# Patient Record
Sex: FEMALE | Race: WHITE | NOT HISPANIC OR LATINO | URBAN - METROPOLITAN AREA
[De-identification: names, ages, dates, MRNs, and addresses within clinical notes are randomized per-mention and may not be internally consistent; named-entity substitution may affect disease eponyms.]

---

## 2017-11-16 ENCOUNTER — OFFICE (AMBULATORY)
Dept: URBAN - METROPOLITAN AREA CLINIC 64 | Facility: CLINIC | Age: 72
End: 2017-11-16

## 2017-11-16 VITALS
HEIGHT: 63 IN | HEART RATE: 60 BPM | DIASTOLIC BLOOD PRESSURE: 76 MMHG | WEIGHT: 150 LBS | SYSTOLIC BLOOD PRESSURE: 153 MMHG

## 2017-11-16 DIAGNOSIS — K21.9 GASTRO-ESOPHAGEAL REFLUX DISEASE WITHOUT ESOPHAGITIS: ICD-10-CM

## 2017-11-16 PROCEDURE — 99212 OFFICE O/P EST SF 10 MIN: CPT | Performed by: INTERNAL MEDICINE

## 2017-11-16 RX ORDER — OMEPRAZOLE 20 MG/1
20 CAPSULE, DELAYED RELEASE ORAL
Qty: 90 | Refills: 3 | Status: ACTIVE

## 2018-11-02 ENCOUNTER — OFFICE (AMBULATORY)
Dept: URBAN - METROPOLITAN AREA CLINIC 64 | Facility: CLINIC | Age: 73
End: 2018-11-02

## 2018-11-02 VITALS
HEART RATE: 56 BPM | SYSTOLIC BLOOD PRESSURE: 161 MMHG | DIASTOLIC BLOOD PRESSURE: 71 MMHG | HEIGHT: 63 IN | WEIGHT: 147 LBS

## 2018-11-02 DIAGNOSIS — K21.9 GASTRO-ESOPHAGEAL REFLUX DISEASE WITHOUT ESOPHAGITIS: ICD-10-CM

## 2018-11-02 DIAGNOSIS — R39.15 URGENCY OF URINATION: ICD-10-CM

## 2018-11-02 PROCEDURE — 99214 OFFICE O/P EST MOD 30 MIN: CPT | Performed by: INTERNAL MEDICINE

## 2018-11-02 RX ORDER — OMEPRAZOLE 20 MG/1
20 CAPSULE, DELAYED RELEASE ORAL
Qty: 90 | Refills: 3 | Status: ACTIVE

## 2018-11-02 RX ORDER — LEVOFLOXACIN 250 MG/1
250 TABLET, FILM COATED ORAL
Qty: 3 | Refills: 0 | Status: COMPLETED
Start: 2018-11-02 | End: 2019-11-18

## 2019-11-18 ENCOUNTER — OFFICE (AMBULATORY)
Dept: URBAN - METROPOLITAN AREA CLINIC 64 | Facility: CLINIC | Age: 74
End: 2019-11-18

## 2019-11-18 VITALS
HEART RATE: 53 BPM | SYSTOLIC BLOOD PRESSURE: 185 MMHG | HEIGHT: 63 IN | DIASTOLIC BLOOD PRESSURE: 89 MMHG | WEIGHT: 129 LBS

## 2019-11-18 DIAGNOSIS — K21.9 GASTRO-ESOPHAGEAL REFLUX DISEASE WITHOUT ESOPHAGITIS: ICD-10-CM

## 2019-11-18 DIAGNOSIS — I48.91 UNSPECIFIED ATRIAL FIBRILLATION: ICD-10-CM

## 2019-11-18 DIAGNOSIS — I10 ESSENTIAL (PRIMARY) HYPERTENSION: ICD-10-CM

## 2019-11-18 DIAGNOSIS — Z86.010 PERSONAL HISTORY OF COLONIC POLYPS: ICD-10-CM

## 2019-11-18 PROCEDURE — 99213 OFFICE O/P EST LOW 20 MIN: CPT | Performed by: INTERNAL MEDICINE

## 2019-11-18 RX ORDER — OMEPRAZOLE 20 MG/1
20 CAPSULE, DELAYED RELEASE ORAL
Qty: 90 | Refills: 3 | Status: ACTIVE

## 2023-05-03 ENCOUNTER — TRANSCRIBE ORDERS (OUTPATIENT)
Dept: PHYSICAL THERAPY | Facility: CLINIC | Age: 78
End: 2023-05-03

## 2023-05-03 DIAGNOSIS — M79.604 RIGHT LEG PAIN: Primary | ICD-10-CM

## 2023-05-04 ENCOUNTER — TREATMENT (OUTPATIENT)
Dept: PHYSICAL THERAPY | Facility: CLINIC | Age: 78
End: 2023-05-04
Payer: MEDICARE

## 2023-05-04 DIAGNOSIS — M25.551 RIGHT HIP PAIN: ICD-10-CM

## 2023-05-04 DIAGNOSIS — M79.604 RIGHT LEG PAIN: Primary | ICD-10-CM

## 2023-05-04 PROCEDURE — 97162 PT EVAL MOD COMPLEX 30 MIN: CPT | Performed by: PHYSICAL THERAPIST

## 2023-05-04 PROCEDURE — 97110 THERAPEUTIC EXERCISES: CPT | Performed by: PHYSICAL THERAPIST

## 2023-05-04 NOTE — PROGRESS NOTES
Physical Therapy Initial Evaluation and Plan of Care  Office: [address] [facility] [address], [place] [address]  P: [phone]  F: [phone]    Patient: Nikole Long   : 1945  Diagnosis/ICD-10 Code:  Right leg pain [M79.604]  Referring practitioner: Nikolas Crabtree MD  Date of Initial Visit: 2023  Today's Date: 2023  Patient seen for 1 sessions           Subjective Questionnaire: LEFS: 20% functional ability       Subjective Evaluation    History of Present Illness  Mechanism of injury: Pt reports that she is having R hip and thigh pain. Pain is intermittent. Takes pain medication which makes the pain go away and sometimes it doesn't come back afterwards. Sometimes doesn't go away completely. Feels like the pain is progressing. Also is having some dizziness today if she moves her head too quickly. Started today. Saw MD yesterday and they gave her a medication for muscle relaxer and for nerve pain. Also taking another pain medication. Hasn't helped much so far. Had x-rays done on Monday at [facility] and they didn't really show anything. They think maybe muscle tear so sent to Ortho specialist. Going to have MRI done tomorrow. Pain began a few weeks ago. Had previous bout back in Dec which lasted ~3 weeks. Had MRI and CT scan which didn't show anything that PCP could see. ER earlier this week. No pain in the lower back, just in the R leg. No numbness or tingling. Pain doesn't go to the knee or past it. No falls or car accidents recently. Pain tends to be worse in the morning. Did wake her up last night from the pain.     Pain  Current pain ratin  At best pain ratin  At worst pain rating: 10  Quality: sharp, knife-like and dull ache  Relieving factors: medications  Aggravating factors: movement, ambulation and sleeping    Diagnostic Tests  X-ray: normal (per pt report )    Patient Goals  Patient goals for therapy: decreased edema, decreased pain, increased strength, independence with  ADLs/IADLs, increased motion and return to sport/leisure activities  Patient goal: gardening, shopping, sleeping          History reviewed. No pertinent past medical history.   Pt reports h/o Afib, HTN, high cholesterol, allergy to penicillin and osteoporosis.     No past surgical history on file.     Objective          Palpation     Additional Palpation Details  No tenderness around the hip     Neurological Testing     Sensation     Hip   Left Hip   Intact: light touch    Right Hip   Intact: light touch    Reflexes   Left   Babinski sign: negative  Clonus sign: negative    Right   Babinski sign: negative  Clonus sign: negative    Active Range of Motion     Lumbar   Flexion: WFL  Extension: 50 degrees with pain  Left lateral flexion: WFL  Right lateral flexion: 50 degrees with pain  Left rotation: WFL  Right rotation: WFL  Left Hip   Internal rotation (90/90): 50 degrees     Right Hip   Internal rotation (90/90): 25 degrees     Additional Active Range of Motion Details  Measurements reported as percentage of normal ROM.    Measurements reported as percentage of normal ROM.    Min soft tissue tightness with ER     Strength/Myotome Testing     Left Hip   Planes of Motion   Flexion: 4  Extension: 3+  Abduction: 3+  Adduction: 4-    Right Hip   Planes of Motion   Flexion: 4-  Extension: 3+  Abduction: 3+  Adduction: 3+    Tests     Right Hip   Negative scour.   SLR: Negative.     Ambulation   Weight-Bearing Status   Assistive device used: none    Ambulation: Level Surfaces   Ambulation without assistive device: independent    Observational Gait   Gait: antalgic   Decreased right stance time.     Additional Observational Gait Details  IR of B hips with R worse than L causing toe-in pattern           Assessment & Plan     Assessment  Impairments: abnormal coordination, abnormal gait, abnormal muscle firing, abnormal muscle tone, abnormal or restricted ROM, activity intolerance, impaired balance, impaired physical  strength, lacks appropriate home exercise program and pain with function  Functional Limitations: carrying objects, lifting, sleeping, walking, uncomfortable because of pain, moving in bed, sitting, standing and stooping  Assessment details: Pt is a 78 year old female with c/o R thigh pain. Pt demonstrates some restriction with hip IR B, however did not reproduce her leg pain. Did note some increased pain with lumbar ext as well as R lateral flex. Poor core stabilization noted as well as decreased strength B hips. Functional limitations are listed above. Pt will benefit from PT in order to address impairments and improve overall function.     Prognosis: good    Goals  Plan Goals: STG (3 weeks):  Pt will demonstrate increased activation of core stabilizers during activities/exercises to decrease load on hip/thigh.   Pt will display improved ROM of lumbar spine as well as hip to WFL with min to no pain to assist with functional tasks.     LTG (6 weeks):  Pt will be independent with home exercises to assist with improved strength and continue to improve function.   Pt will demonstrate decreased score on the LEFS to 50% to demonstrate decreased overall impairment.   Pt will be able to amb with less pain in the R thigh as well as be able to sleep through the night without waking up due to pain.   Pt will demonstrate improved hip and core strength to 4/5 overall to assist with function.        Plan  Therapy options: will be seen for skilled therapy services  Planned modality interventions: TENS, cryotherapy and thermotherapy (hydrocollator packs)  Planned therapy interventions: abdominal trunk stabilization, ADL retraining, balance/weight-bearing training, body mechanics training, flexibility, functional ROM exercises, gait training, home exercise program, joint mobilization, manual therapy, motor coordination training, neuromuscular re-education, postural training, soft tissue mobilization, spinal/joint mobilization,  strengthening, stretching and therapeutic activities  Frequency: 2x week  Duration in weeks: 12  Treatment plan discussed with: patient        HEP:   Access Code: [email or web address]  URL: [email or web address]  Date: 05/10/2023  Prepared by: Delmy Hall    Exercises  - Supine Lower Trunk Rotation  - 2 x daily - 10 reps - 5 sec hold  - Supine Single Knee to Chest Stretch  - 2 x daily - 3 reps - 30 sec hold  - Supine Hip Adduction Isometric with Ball  - 2 x daily - 10 reps - 5 sec hold    History # of Personal Factors and/or Comorbidities: MODERATE (1-2)  Examination of Body System(s): # of elements: MODERATE (3)  Clinical Presentation: EVOLVING  Clinical Decision Making: MODERATE      Eval:  Low Eval          Mins  69504  Mod Eval     30     Mins  67824  High Eval                            Mins  12554    Timed:         Manual Therapy:         mins  90659;     Therapeutic Exercise:    15     mins  75008;     Neuromuscular Sharlene:        mins  38530;    Therapeutic Activity:          mins  19291;     Gait Training:           mins  03276;       Un-Timed:  Electrical Stimulation:         mins  62584 ( );  Traction          mins 79778      Timed Treatment:   15   mins   Total Treatment:     45   mins    PT SIGNATURE: Delmy Hall, PT, DPT, OCS           [place] License # [id number]              DATE TREATMENT INITIATED: 5/4/2023    Initial Certification  Certification Period: 8/1/2023  I certify that the therapy services are furnished while this patient is under my care.  The services outlined above are required by this patient, and will be reviewed every 90 days.     PHYSICIAN: Nikolas Crabtree MD      DATE:     Please sign and return via fax to [phone].. Thank you, [facility].

## 2023-05-12 ENCOUNTER — TREATMENT (OUTPATIENT)
Dept: PHYSICAL THERAPY | Facility: CLINIC | Age: 78
End: 2023-05-12
Payer: MEDICARE

## 2023-05-12 DIAGNOSIS — M25.551 RIGHT HIP PAIN: ICD-10-CM

## 2023-05-12 DIAGNOSIS — M79.604 RIGHT LEG PAIN: Primary | ICD-10-CM

## 2023-05-12 PROCEDURE — 97140 MANUAL THERAPY 1/> REGIONS: CPT | Performed by: PHYSICAL THERAPIST

## 2023-05-12 PROCEDURE — 97110 THERAPEUTIC EXERCISES: CPT | Performed by: PHYSICAL THERAPIST

## 2023-05-12 PROCEDURE — 97112 NEUROMUSCULAR REEDUCATION: CPT | Performed by: PHYSICAL THERAPIST

## 2023-05-12 NOTE — PROGRESS NOTES
Physical Therapy Daily Note  Office: [address] [facility] [address], [place] [address]  P: [phone]  F: [phone]    Patient: Nikole Long   : 1945  Diagnosis/ICD-10 Code:  Right leg pain [M79.604]  Referring practitioner: Nikolas Crabtree MD  Today's Date: 2023  Patient seen for 2 sessions                                                                                                                                                                                                                                                                                                                               VISIT#: 2/10 sessions authorized per ins (PN due: 2023)     Precautions/Restrictions: H/o Afib, HTN, osteoporosis     Subjective  Nikole Long and her  provided subjective history. Pt's  reports that he had to call 911 on  morning because pt was in so much pain. They did CT scan, MRI and x-rays per 's reports and didn't find anything. Says they kept telling him they think it's a muscle tear but he doesn't know which muscle. Pain isn't as severe during the day but is worse at night time. She hasn't done any of the exercises because she was afraid.       Objective  Tenderness R side lumbar spine, min posterior hip, none in R LE     See Exercise, Manual, and Modality Logs for complete treatment.     Home Exercises:  [id number]    Assessment/Plan   Pt displays tenderness in posterior hip musculature as well a R lower lumbar facets. Improved with manual treatment. Instructed pt through exercises again and discussed performing around breakfast and then again after lunch but to stop if she notices any increase in symptoms. Pt and pt's  also educated on how joint dysfunctions as well as irritation of nerves don't always show up on imaging so that could be why the MRI and CT scans were both neg.       Progress per Plan of Care and Progress strengthening /stabilization  /functional activity            Timed:         Manual Therapy:    15     mins  47384;     Therapeutic Exercise:    15     mins  26060;     Neuromuscular Sharlene:    15    mins  28942;    Therapeutic Activity:          mins  03690;     Gait Training:           mins  98373;     Ultrasound:          mins  22441;    Ionto                                   mins   73863  Self Care                            mins   16119    Un-Timed:  Electrical Stimulation:         mins  68856 ( );  Traction          mins 76607    Timed Treatment:   45   mins   Total Treatment:     45   mins    Delmy Hall PT, DPT, OCS     [place] License # [id number]

## 2023-05-16 ENCOUNTER — TREATMENT (OUTPATIENT)
Dept: PHYSICAL THERAPY | Facility: CLINIC | Age: 78
End: 2023-05-16
Payer: MEDICARE

## 2023-05-16 DIAGNOSIS — M79.604 RIGHT LEG PAIN: Primary | ICD-10-CM

## 2023-05-16 DIAGNOSIS — M25.551 RIGHT HIP PAIN: ICD-10-CM

## 2023-05-16 PROCEDURE — 97112 NEUROMUSCULAR REEDUCATION: CPT | Performed by: PHYSICAL THERAPIST

## 2023-05-16 PROCEDURE — 97140 MANUAL THERAPY 1/> REGIONS: CPT | Performed by: PHYSICAL THERAPIST

## 2023-05-16 PROCEDURE — 97110 THERAPEUTIC EXERCISES: CPT | Performed by: PHYSICAL THERAPIST

## 2023-05-16 NOTE — PROGRESS NOTES
Physical Therapy Daily Note  Office: [address] [facility] [address], [place] [address]  P: [phone]  F: [phone]    Patient: Nikole Long   : 1945  Diagnosis/ICD-10 Code:  Right leg pain [M79.604]  Referring practitioner: Nikolas Crabtree MD  Today's Date: 2023  Patient seen for 3 sessions                                                                                                                                                                                                                                                                                                                               VISIT#: 3/10 sessions authorized per ins (PN due: 2023)     Precautions/Restrictions: H/o Afib, HTN, osteoporosis     Subjective  Nikole Long reports that she is having more pain today. Pain always increases throughout the night and is the worst in the morning when she tries to get up. Thinks it's from lying still so long at night. Once she has pain medicine, then the pain eases enough so that she can get up and move and then the pain is more tolerable during the day. They haven't been able to get more pain medicine yet because Yousuf is having issues with their system but they got a phone call so they're going to go to the MD's office today to check on it.       Objective  Tenderness R side lumbar spine, min posterior hip, none in R LE     See Exercise, Manual, and Modality Logs for complete treatment.     Home Exercises:  [id number]    Assessment/Plan   Pt displays more tenderness in posterior hip musculature that improved with manual treatment. Less pain noted after manual treatment. Did have some pain with SKTC, however when used strap, there was no pain. Attempted MHP at end of session which pt liked, however pain did increased possibly from hooklying position. Pain eased once she sat up.       Progress per Plan of Care and Progress strengthening /stabilization /functional activity             Timed:         Manual Therapy:    15     mins  66404;     Therapeutic Exercise:    15     mins  18617;     Neuromuscular Sharlene:    8    mins  48296;    Therapeutic Activity:          mins  52788;     Gait Training:           mins  69334;     Ultrasound:          mins  65462;    Ionto                                   mins   76712  Self Care                            mins   63429    Un-Timed:  Electrical Stimulation:         mins  26674 ( );  Traction          mins 73039  MHP                             10   mins     Timed Treatment:   38   mins   Total Treatment:     48   mins    Delmy Hall PT, DPT, OCS     [place] License # [id number]

## 2023-05-19 ENCOUNTER — TREATMENT (OUTPATIENT)
Dept: PHYSICAL THERAPY | Facility: CLINIC | Age: 78
End: 2023-05-19
Payer: MEDICARE

## 2023-05-19 DIAGNOSIS — M79.604 RIGHT LEG PAIN: Primary | ICD-10-CM

## 2023-05-19 DIAGNOSIS — M25.551 RIGHT HIP PAIN: ICD-10-CM

## 2023-05-19 PROCEDURE — 97110 THERAPEUTIC EXERCISES: CPT | Performed by: PHYSICAL THERAPIST

## 2023-05-19 PROCEDURE — 97140 MANUAL THERAPY 1/> REGIONS: CPT | Performed by: PHYSICAL THERAPIST

## 2023-05-19 PROCEDURE — 97112 NEUROMUSCULAR REEDUCATION: CPT | Performed by: PHYSICAL THERAPIST

## 2023-05-19 NOTE — PROGRESS NOTES
Physical Therapy Daily Note  Office: [address] [facility] [address], [place] [address]  P: [phone]  F: [phone]    Patient: Nikole Long   : 1945  Diagnosis/ICD-10 Code:  Right leg pain [M79.604]  Referring practitioner: Nikolas Crabtree MD  Today's Date: 2023  Patient seen for 4 sessions                                                                                                                                                                                                                                                                                                                               VISIT#: 4/10 sessions authorized per ins (PN due: 2023)     Precautions/Restrictions: H/o Afib, HTN, osteoporosis     Subjective  Nikole Long reports that she is still having pain. Feels like it might be getting worse. Is always there in the morning but had increased yesterday afternoon too. Not sure why. Exercises are going okay and she doesn't feel like any of them are increasing pain. She did fall ~1 week ago now and landed on the R hip. Has some bruising which is subsiding now, however tenderness on the ischial tuberosity has increased. Doesn't feel it normally, just if you push on it. Hasn't told her MD about it yet.       Objective  Tenderness R side lumbar spine, min posterior hip, none in R LE     See Exercise, Manual, and Modality Logs for complete treatment.     Home Exercises:  [id number]    Assessment/Plan   Pt displays less pain in the posterolateral hip musculature after manual treatment. Did note more hypertonicity in the posterior hip musculature this date as well. Pt noted some pain with LTR this date so held. Instructed pt and her  that they should contact MD about the fall to see if they want to do x-rays since pain is worsening now. Also instructed them to hold any exercises that starts to increase her pain even if it didn't the day before.    Progress per Plan of  Care and Progress strengthening /stabilization /functional activity            Timed:         Manual Therapy:    15     mins  16061;     Therapeutic Exercise:    15     mins  64880;     Neuromuscular Sharlene:    8    mins  76009;    Therapeutic Activity:          mins  88163;     Gait Training:           mins  67232;     Ultrasound:          mins  11291;    Ionto                                   mins   88158  Self Care                            mins   40864    Un-Timed:  Electrical Stimulation:         mins  51388 ( );  Traction          mins 47772  MHP                             10   mins     Timed Treatment:   38   mins   Total Treatment:     48   mins    Delmy Hall PT, DPT, OCS     [place] License # [id number]

## 2023-05-24 ENCOUNTER — TREATMENT (OUTPATIENT)
Dept: PHYSICAL THERAPY | Facility: CLINIC | Age: 78
End: 2023-05-24

## 2023-05-24 DIAGNOSIS — M25.551 RIGHT HIP PAIN: ICD-10-CM

## 2023-05-24 DIAGNOSIS — M79.604 RIGHT LEG PAIN: Primary | ICD-10-CM

## 2023-05-24 PROCEDURE — 97112 NEUROMUSCULAR REEDUCATION: CPT | Performed by: PHYSICAL THERAPIST

## 2023-05-24 PROCEDURE — 97110 THERAPEUTIC EXERCISES: CPT | Performed by: PHYSICAL THERAPIST

## 2023-05-24 PROCEDURE — 97140 MANUAL THERAPY 1/> REGIONS: CPT | Performed by: PHYSICAL THERAPIST

## 2023-05-24 NOTE — PROGRESS NOTES
Physical Therapy Daily Note  Office: [address] [facility] [address], [place] [address]  P: [phone]  F: [phone]    Patient: Nikole Long   : 1945  Diagnosis/ICD-10 Code:  Right leg pain [M79.604]  Referring practitioner: Nikolas Crabtree MD  Today's Date: 2023  Patient seen for 5 sessions                                                                                                                                                                                                                                                                                                                               VISIT#: 5/10 sessions authorized per ins (PN due: 2023)     Precautions/Restrictions: H/o Afib, HTN, osteoporosis     Subjective  Nikole Long and  reports that she had pain over the weekend still. Called Ortho and went to see him yesterday. Had x-rays of hip taken again and showed no fractures from her most recent fall. MD couldn't see anything wrong. Pt has been feeling better the last few days and not having as much pain. Has had a couple evenings that she had no pain, not even the soreness. Had a bad night last night with pain but no sharp pains this morning when she went to get up and it's not feeling too bad right now.       Objective  Tenderness R side lumbar spine, min posterior hip, none in R LE     See Exercise, Manual, and Modality Logs for complete treatment.     Home Exercises:  [id number]    Assessment/Plan   Pt displays good progress with core stabilization. Some cuing required for technique but pt is improving with that as well. Pt seems to be improving overall.     Progress per Plan of Care and Progress strengthening /stabilization /functional activity            Timed:         Manual Therapy:    15     mins  52782;     Therapeutic Exercise:    15     mins  93462;     Neuromuscular Sharlene:    8    mins  13636;    Therapeutic Activity:          mins  57419;     Gait  Training:           mins  73648;     Ultrasound:          mins  76081;    Ionto                                   mins   18013  Self Care                            mins   56699    Un-Timed:  Electrical Stimulation:         mins  13864 ( );  Traction          mins 70506  MHP                             10   mins     Timed Treatment:   38   mins   Total Treatment:     48   mins    Delmy Hall, PT, DPT, OCS     [place] License # [id number]

## 2023-05-26 ENCOUNTER — TREATMENT (OUTPATIENT)
Dept: PHYSICAL THERAPY | Facility: CLINIC | Age: 78
End: 2023-05-26

## 2023-05-26 DIAGNOSIS — M25.551 RIGHT HIP PAIN: ICD-10-CM

## 2023-05-26 DIAGNOSIS — M79.604 RIGHT LEG PAIN: Primary | ICD-10-CM

## 2023-05-26 PROCEDURE — 97140 MANUAL THERAPY 1/> REGIONS: CPT | Performed by: PHYSICAL THERAPIST

## 2023-05-26 PROCEDURE — 97110 THERAPEUTIC EXERCISES: CPT | Performed by: PHYSICAL THERAPIST

## 2023-05-26 PROCEDURE — 97112 NEUROMUSCULAR REEDUCATION: CPT | Performed by: PHYSICAL THERAPIST

## 2023-05-26 NOTE — PROGRESS NOTES
Physical Therapy Daily Note  Office: [address] [facility] [address], [place] [address]  P: [phone]  F: [phone]    Patient: Nikole Long   : 1945  Diagnosis/ICD-10 Code:  Right leg pain [M79.604]  Referring practitioner: Nikolas Crabtree MD  Today's Date: 2023  Patient seen for 6 sessions                                                                                                                                                                                                                                                                                                                               VISIT#: 6/10 sessions authorized per ins (PN due: 2023)     Precautions/Restrictions: H/o Afib, HTN, osteoporosis     Subjective  Nikole Long reports that her pain has been doing better. She still has pain in the mornings but the severe pain is easing off more quickly. Also hasn't really had any pain during the day for the last two days. Feels like things are starting to improve.       Objective  Tenderness R side lumbar spine, min posterior hip, none in R LE     See Exercise, Manual, and Modality Logs for complete treatment.     Home Exercises:  [id number]    Assessment/Plan   Pt displays improved ability to perform exercises with no c/o pain this date. Pt had no pain when arriving to clinic either which is improved from other sessions. Pt instructed to cont with exercises at home but only do the ones that she can without any pain even if it is different than the day before.     Progress per Plan of Care and Progress strengthening /stabilization /functional activity            Timed:         Manual Therapy:    15     mins  74574;     Therapeutic Exercise:    15     mins  71107;     Neuromuscular Sharlene:    8    mins  49555;    Therapeutic Activity:          mins  37903;     Gait Training:           mins  91356;     Ultrasound:          mins  89528;    Ionto                                   mins    65025  Self Care                            mins   18861    Un-Timed:  Electrical Stimulation:         mins  57025 ( );  Traction          mins 90438  MHP                             10   mins     Timed Treatment:   38   mins   Total Treatment:     48   mins    Delmy Hall PT, DPT, OCS     [place] License # [id number]

## 2023-06-01 ENCOUNTER — TREATMENT (OUTPATIENT)
Dept: PHYSICAL THERAPY | Facility: CLINIC | Age: 78
End: 2023-06-01
Payer: MEDICARE

## 2023-06-01 DIAGNOSIS — M25.551 RIGHT HIP PAIN: ICD-10-CM

## 2023-06-01 DIAGNOSIS — M79.604 RIGHT LEG PAIN: Primary | ICD-10-CM

## 2023-06-01 PROCEDURE — 97110 THERAPEUTIC EXERCISES: CPT | Performed by: PHYSICAL THERAPIST

## 2023-06-01 PROCEDURE — 97112 NEUROMUSCULAR REEDUCATION: CPT | Performed by: PHYSICAL THERAPIST

## 2023-06-01 PROCEDURE — 97140 MANUAL THERAPY 1/> REGIONS: CPT | Performed by: PHYSICAL THERAPIST

## 2023-06-01 NOTE — PROGRESS NOTES
Physical Therapy Daily Note  Office: [address] [facility] [address], [place] [address]  P: [phone]  F: [phone]    Patient: Nikole Long   : 1945  Diagnosis/ICD-10 Code:  Right leg pain [M79.604]  Referring practitioner: Nikolas Crabtree MD  Today's Date: 2023  Patient seen for 7 sessions                                                                                                                                                                                                                                                                                                                               VISIT#: 7/10 sessions authorized per ins (PN due: 2023)     Precautions/Restrictions: H/o Afib, HTN, osteoporosis     Subjective  Nikole Long reports that she had a good week until yesterday. Pain was really bad yesterday. But she slept through the entire night last night. Hasn't done that in a long time. Feels pretty good today.       Objective  Tenderness R side lumbar spine, min posterior hip, none in R LE     See Exercise, Manual, and Modality Logs for complete treatment.     Home Exercises:  [id number]    Assessment/Plan   Pt displays good progress with mobility of the LE's with stretching. No increased pain with treatment this date. Discussed pt continuing with HEP and that it is normal to have days with more pain still as long as she is having more days with less or no pain. Pt is progressing well overall.     Progress per Plan of Care and Progress strengthening /stabilization /functional activity            Timed:         Manual Therapy:    15     mins  36529;     Therapeutic Exercise:    15     mins  66771;     Neuromuscular Sharlene:    8    mins  42759;    Therapeutic Activity:          mins  07346;     Gait Training:           mins  46997;     Ultrasound:          mins  33601;    Ionto                                   mins   52501  Self Care                            mins    29546    Un-Timed:  Electrical Stimulation:         mins  35653 ( );  Traction          mins 21735  [facility]                                mins     Timed Treatment:   38   mins   Total Treatment:     38   mins    Delmy Hall PT, DPT, OCS     [place] License # [id number]

## 2023-06-05 ENCOUNTER — TREATMENT (OUTPATIENT)
Dept: PHYSICAL THERAPY | Facility: CLINIC | Age: 78
End: 2023-06-05
Payer: MEDICARE

## 2023-06-05 DIAGNOSIS — M79.604 RIGHT LEG PAIN: Primary | ICD-10-CM

## 2023-06-05 DIAGNOSIS — M25.551 RIGHT HIP PAIN: ICD-10-CM

## 2023-06-05 PROCEDURE — 97140 MANUAL THERAPY 1/> REGIONS: CPT | Performed by: PHYSICAL THERAPIST

## 2023-06-05 PROCEDURE — 97110 THERAPEUTIC EXERCISES: CPT | Performed by: PHYSICAL THERAPIST

## 2023-06-05 PROCEDURE — 97112 NEUROMUSCULAR REEDUCATION: CPT | Performed by: PHYSICAL THERAPIST

## 2023-06-05 NOTE — PROGRESS NOTES
Physical Therapy Daily Note  Office: [address] [facility] [address], [place] [address]  P: [phone]  F: [phone]    Patient: Nikole Long   : 1945  Diagnosis/ICD-10 Code:  Right leg pain [M79.604]  Referring practitioner: Nikolas Crabtree MD  Today's Date: 2023  Patient seen for 8 sessions                                                                                                                                                                                                                                                                                                                               VISIT#: 8/10 sessions authorized per ins (PN due: 2023)     Precautions/Restrictions: H/o Afib, HTN, osteoporosis     Subjective  Nikole Long reports that her pain intensity is decreasing. Not having the sharp pain anymore, just an ache in the leg that is usually relieved if she pressed on it. She was able to sleep two full nights in a row without waking up due to pain.       Objective  Tenderness R side lumbar spine, min posterior hip, none in R LE     See Exercise, Manual, and Modality Logs for complete treatment.     Home Exercises:  [id number]    Assessment/Plan   Pt displays improved ability to perform exercises without increased pain. Improved mobility of the R side lumbar spine as well. Pt is progressing well overall.     Progress per Plan of Care and Progress strengthening /stabilization /functional activity            Timed:         Manual Therapy:    15     mins  56084;     Therapeutic Exercise:    15     mins  11450;     Neuromuscular Sharlene:    8    mins  20325;    Therapeutic Activity:          mins  03225;     Gait Training:           mins  34316;     Ultrasound:          mins  92972;    Ionto                                   mins   24663  Self Care                            mins   35890    Un-Timed:  Electrical Stimulation:         mins  71259 ( );  Traction          mins  91498  RUST                                mins     Timed Treatment:   38   mins   Total Treatment:     38   mins    Delmy Hall, PT, DPT, OCS     [place] License # [id number]

## 2023-06-08 ENCOUNTER — TREATMENT (OUTPATIENT)
Dept: PHYSICAL THERAPY | Facility: CLINIC | Age: 78
End: 2023-06-08
Payer: MEDICARE

## 2023-06-08 DIAGNOSIS — M25.551 RIGHT HIP PAIN: ICD-10-CM

## 2023-06-08 DIAGNOSIS — M79.604 RIGHT LEG PAIN: Primary | ICD-10-CM

## 2023-06-08 PROCEDURE — 97140 MANUAL THERAPY 1/> REGIONS: CPT | Performed by: PHYSICAL THERAPIST

## 2023-06-08 PROCEDURE — 97110 THERAPEUTIC EXERCISES: CPT | Performed by: PHYSICAL THERAPIST

## 2023-06-08 NOTE — PROGRESS NOTES
Physical Therapy Daily Note  Office: [address] [facility] [address], [place] [address]  P: [phone]  F: [phone]    Patient: Nikole Long   : 1945  Diagnosis/ICD-10 Code:  Right leg pain [M79.604]  Referring practitioner: Nikolas Crabtree MD  Today's Date: 2023  Patient seen for 9 sessions                                                                                                                                                                                                                                                                                                                               VISIT#: 9/10 sessions authorized per ins (PN due: 2023)     Precautions/Restrictions: H/o Afib, HTN, osteoporosis     Subjective  Nikole Long reports that she is doing well. Sleeping through the night more.       Objective  Tenderness R side lumbar spine, min posterior hip, none in R LE     See Exercise, Manual, and Modality Logs for complete treatment.     Home Exercises:  [id number]    Assessment/Plan   Pt displays improved ability to perform lumbar ROM with exercises. Still requires some cuing to perform exercises correctly which  is helping her with at home. Pt able to perform all exercises without increased pain.     Progress per Plan of Care and Progress strengthening /stabilization /functional activity            Timed:         Manual Therapy:    15     mins  14206;     Therapeutic Exercise:    8     mins  86549;     Neuromuscular Sharlene:        mins  65600;    Therapeutic Activity:          mins  86388;     Gait Training:           mins  32422;     Ultrasound:          mins  41013;    Ionto                                   mins   87495  Self Care                            mins   00187    Un-Timed:  Electrical Stimulation:         mins  49894 ( );  Traction          mins 62132  MHP                                mins     Timed Treatment:   23   mins   Total Treatment:     23    mins (pt in clinic for ~40 mins total)    Delmy Hall, PT, DPT, OCS     [place] License # [id number]

## 2023-06-12 ENCOUNTER — TREATMENT (OUTPATIENT)
Dept: PHYSICAL THERAPY | Facility: CLINIC | Age: 78
End: 2023-06-12
Payer: MEDICARE

## 2023-06-12 DIAGNOSIS — M79.604 RIGHT LEG PAIN: Primary | ICD-10-CM

## 2023-06-12 DIAGNOSIS — M25.551 RIGHT HIP PAIN: ICD-10-CM

## 2023-06-12 PROCEDURE — 97110 THERAPEUTIC EXERCISES: CPT | Performed by: PHYSICAL THERAPIST

## 2023-06-12 PROCEDURE — 97112 NEUROMUSCULAR REEDUCATION: CPT | Performed by: PHYSICAL THERAPIST

## 2023-06-12 PROCEDURE — 97140 MANUAL THERAPY 1/> REGIONS: CPT | Performed by: PHYSICAL THERAPIST

## 2023-06-12 NOTE — PROGRESS NOTES
Re-Assessment/Progress Note  Office: [address] [facility] [address], [place] [address]  P: [phone]   F: [phone]        Patient: Nikole Long   : 1945  Diagnosis/ICD-10 Code:  Right leg pain [M79.604]  Referring practitioner: Nikolas Crabtree MD  Date of Initial Visit: Type: THERAPY  Noted: 2023  Today's Date: 2023  Patient seen for 10 sessions      Subjective:   Nikole Long reports that the pain is doing better. She isn't having any stabbing pain any more. Is having many nights that she goes to bed with no pain and doesn't have much during the day. Can sleep through the night most nights now but does still wake up with pain. Doesn't last as long as it was before.    Pain: Current: 5/10, Best: 0/10, Worst: 8-9/10    Subjective Questionnaire: LEFS: 74% functional ability   Clinical Progress: improved  Home Program Compliance: Yes  Treatment has included: therapeutic exercise, neuromuscular re-education, manual therapy, therapeutic activity, and moist heat    Objective   Lumbar AROM: WNL without pain   Hip ROM: slightly limited on the R with min pain  LE strength: knees 4+/5; ankles 4+/5; hips 4/5   Gait: ambulating without AD now, min-mod unsteadiness    Assessment & Plan     Assessment  Impairments: abnormal coordination, abnormal gait, abnormal muscle firing, abnormal muscle tone, abnormal or restricted ROM, activity intolerance, impaired balance, impaired physical strength and pain with function  Functional Limitations: carrying objects, lifting, sleeping, walking, uncomfortable because of pain, moving in bed, sitting, standing and stooping  Assessment details: Pt demonstrates good progress with ROM of the lumbar spine. Min restriction with hip mobility on the R. Still some increased muscle guarding in the R posterior hip musculature, however has improved since start of PT. Pt also demonstrates improved core stability with exercises. Pt is able to perform more tasks at home with less  pain, has improved sleep and is able to amb without RW. Pt continues to have some pain in the mornings mostly.     Pt will continue to benefit from PT in order to further address impairments and improve overall function.   Prognosis: good    Goals  Plan Goals: STG (3 weeks):  Pt will demonstrate increased activation of core stabilizers during activities/exercises to decrease load on hip/thigh. - met  Pt will display improved ROM of lumbar spine as well as hip to WFL with min to no pain to assist with functional tasks. - met    LTG (6 weeks):  Pt will be independent with home exercises to assist with improved strength and continue to improve function. - met  Pt will demonstrate decreased score on the LEFS to 50% to demonstrate decreased overall impairment. - met  Pt will be able to amb with less pain in the R thigh as well as be able to sleep through the night without waking up due to pain. - mostly met  Pt will demonstrate improved hip and core strength to 4/5 overall to assist with function. - partially met       Plan  Therapy options: will be seen for skilled therapy services  Planned modality interventions: TENS, cryotherapy and thermotherapy (hydrocollator packs)  Planned therapy interventions: abdominal trunk stabilization, ADL retraining, balance/weight-bearing training, body mechanics training, flexibility, functional ROM exercises, gait training, home exercise program, joint mobilization, manual therapy, motor coordination training, neuromuscular re-education, postural training, soft tissue mobilization, spinal/joint mobilization, strengthening, stretching and therapeutic activities  Frequency: 2x week  Duration in weeks: 5  Treatment plan discussed with: patient    Progress toward previous goals: Partially Met        Recommendations: Continue as planned  Timeframe: 1 month  Prognosis to achieve goals: good    HEP:   HS stretch       Based upon review of the patient's progress and continued therapy plan, it  is my medical opinion that Nikole Long should continue physical therapy treatment at [facility]  [address]  [place] [address] [phone].    Signature: __________________________________  Nikolas Crabtree MD    Timed:         Manual Therapy:    15     mins  09962;     Therapeutic Exercise:    8     mins  83704;     Neuromuscular Sharlene:    15    mins  57835;    Therapeutic Activity:          mins  76534;     Gait Training:           mins  40493;     Ultrasound:          mins  52149;    Ionto                                   mins   17882  Self Care                            mins   68426    Un-Timed:  Electrical Stimulation:         mins  13708 ( );  Traction          mins 71155  Re-Eval                               mins  59156      Timed Treatment:   38   mins   Total Treatment:     38   mins      PT Signature: Delmy Hall, PT, DPT, OCS     [place] License # [id number]

## 2023-06-15 ENCOUNTER — TREATMENT (OUTPATIENT)
Dept: PHYSICAL THERAPY | Facility: CLINIC | Age: 78
End: 2023-06-15
Payer: MEDICARE

## 2023-06-15 DIAGNOSIS — M25.551 RIGHT HIP PAIN: ICD-10-CM

## 2023-06-15 DIAGNOSIS — M79.604 RIGHT LEG PAIN: Primary | ICD-10-CM

## 2023-06-15 PROCEDURE — 97112 NEUROMUSCULAR REEDUCATION: CPT | Performed by: PHYSICAL THERAPIST

## 2023-06-15 PROCEDURE — 97140 MANUAL THERAPY 1/> REGIONS: CPT | Performed by: PHYSICAL THERAPIST

## 2023-06-15 PROCEDURE — 97110 THERAPEUTIC EXERCISES: CPT | Performed by: PHYSICAL THERAPIST

## 2023-06-15 NOTE — PROGRESS NOTES
Physical Therapy Daily Note  Office: [address] [facility] [address], [place] [address]  P: [phone]  F: [phone]    Patient: Nikole Long   : 1945  Diagnosis/ICD-10 Code:  Right leg pain [M79.604]  Referring practitioner: Nikolas Crabtree MD  Today's Date: 6/15/2023  Patient seen for 11 sessions                                                                                                                                                                                                                                                                                                                               VISIT#: 11/10 sessions authorized per ins (PN due: 2023)     Precautions/Restrictions: H/o Afib, HTN, osteoporosis     Subjective  Nikole Long reports that she did have some pain this morning but sat on the heating pad and moved around some and pain subsided quickly.       Objective  Tenderness R side lumbar spine, min posterior hip, none in R LE     See Exercise, Manual, and Modality Logs for complete treatment.     Home Exercises:  [id number]    Assessment/Plan   Pt continues to demonstrate improvement in overall ROM. Some improvement noted with amb as well, however still some unsteadiness noted. Pt holds onto counters/chairs, etc when walking. No pain with treatment this date.     Progress per Plan of Care and Progress strengthening /stabilization /functional activity            Timed:         Manual Therapy:    15     mins  06611;     Therapeutic Exercise:    15     mins  45919;     Neuromuscular Sharlene:  8      mins  91971;    Therapeutic Activity:          mins  89208;     Gait Training:           mins  38056;     Ultrasound:          mins  23744;    Ionto                                   mins   92327  Self Care                            mins   85601    Un-Timed:  Electrical Stimulation:         mins  62110 (MC );  Traction          mins 75585  MHP                                 mins     Timed Treatment:   38   mins   Total Treatment:     38   mins     Delmy Hall, PT, DPT, OCS     [place] License # [id number]

## 2025-01-10 ENCOUNTER — DOCUMENTATION (OUTPATIENT)
Dept: PHYSICAL THERAPY | Facility: CLINIC | Age: 80
End: 2025-01-10
Payer: MEDICARE

## 2025-01-10 NOTE — PROGRESS NOTES
Discharge Summary  Discharge Summary from Physical Therapy Report      Date of Initial PT visit: 5/4/2023  Number of Visits Seen: 13     Discharge Status of Patient:   Pt demonstrates significant progress with pain since start of PT. Pt is able to perform ADL's and sleep through the night with little to no pain in the lumbar spine or R LE. Pt has improved core stabilization with activities as well. Is independent with HEP (with assist from ) and is performed at least 1x per day. Pt has met all goals and will be discharged at this time.     Goals: All Met    Discharge Plan: Continue with current home exercise program as instructed      Date of Discharge: 1/10/2025      Delmy Hall, PT, DPT, OCS     [place] License # [id number]     [place] License # [id number]